# Patient Record
Sex: MALE | Race: WHITE | NOT HISPANIC OR LATINO | URBAN - METROPOLITAN AREA
[De-identification: names, ages, dates, MRNs, and addresses within clinical notes are randomized per-mention and may not be internally consistent; named-entity substitution may affect disease eponyms.]

---

## 2021-08-05 ENCOUNTER — OFFICE VISIT (OUTPATIENT)
Dept: URBAN - METROPOLITAN AREA CLINIC 33 | Facility: CLINIC | Age: 51
End: 2021-08-05

## 2021-08-05 NOTE — HPI-MIGRATED HPI
Colorectal Cancer Screening : 50 year old male whom presents today for consultation for a colonoscopy.  Patient admits/denies a family history of colon polyps and cancers.  Patient currently admits __ BMs per day. Stools are-- with/without melena, blood, or mucus.   No abdominal pain. No weight loss No CP, SOB or fever. No blood thinners. No sleep apnea No cardiac or pulmonary issues  ;

## 2021-09-13 ENCOUNTER — DASHBOARD ENCOUNTERS (OUTPATIENT)
Age: 51
End: 2021-09-13

## 2021-09-16 ENCOUNTER — TELEPHONE ENCOUNTER (OUTPATIENT)
Dept: URBAN - METROPOLITAN AREA CLINIC 35 | Facility: CLINIC | Age: 51
End: 2021-09-16

## 2021-09-16 ENCOUNTER — OFFICE VISIT (OUTPATIENT)
Dept: URBAN - METROPOLITAN AREA CLINIC 33 | Facility: CLINIC | Age: 51
End: 2021-09-16

## 2021-09-16 NOTE — HPI-MIGRATED HPI
;     Colorectal Cancer Screening : 50 year old male presents today for consultation for a colonoscopy.   --BMs per day, with -stool. No melena, blood, or mucus.   Patient admits denies family history of colon cancer/polyps.   No abdominal pain. No weight loss No CP, SOB or fever. No blood thinners. No sleep apnea No cardiac or pulmonary issues  ;